# Patient Record
Sex: FEMALE | URBAN - METROPOLITAN AREA
[De-identification: names, ages, dates, MRNs, and addresses within clinical notes are randomized per-mention and may not be internally consistent; named-entity substitution may affect disease eponyms.]

---

## 2022-09-20 ENCOUNTER — APPOINTMENT (RX ONLY)
Dept: URBAN - METROPOLITAN AREA CLINIC 15 | Facility: CLINIC | Age: 32
Setting detail: DERMATOLOGY
End: 2022-09-20

## 2022-09-20 VITALS — WEIGHT: 101 LBS | HEIGHT: 62 IN

## 2022-09-20 DIAGNOSIS — L259 CONTACT DERMATITIS AND OTHER ECZEMA, UNSPECIFIED CAUSE: ICD-10-CM

## 2022-09-20 PROBLEM — L23.3 ALLERGIC CONTACT DERMATITIS DUE TO DRUGS IN CONTACT WITH SKIN: Status: ACTIVE | Noted: 2022-09-20

## 2022-09-20 PROCEDURE — ? PRESCRIPTION MEDICATION MANAGEMENT

## 2022-09-20 PROCEDURE — ? COUNSELING

## 2022-09-20 PROCEDURE — 99204 OFFICE O/P NEW MOD 45 MIN: CPT

## 2022-09-20 PROCEDURE — ? PRESCRIPTION

## 2022-09-20 RX ORDER — MUPIROCIN 20 MG/G
1 OINTMENT TOPICAL BID
Qty: 22 | Refills: 0 | Status: ERX | COMMUNITY
Start: 2022-09-20

## 2022-09-20 RX ORDER — BETAMETHASONE DIPROPIONATE 0.5 MG/G
1 OINTMENT TOPICAL BID
Qty: 45 | Refills: 0 | Status: ERX

## 2022-09-20 RX ADMIN — MUPIROCIN 1: 20 OINTMENT TOPICAL at 00:00

## 2022-09-20 ASSESSMENT — LOCATION ZONE DERM: LOCATION ZONE: LEG

## 2022-09-20 ASSESSMENT — LOCATION SIMPLE DESCRIPTION DERM
LOCATION SIMPLE: LEFT ANKLE
LOCATION SIMPLE: RIGHT ANKLE

## 2022-09-20 ASSESSMENT — LOCATION DETAILED DESCRIPTION DERM
LOCATION DETAILED: RIGHT ANTERIOR MEDIAL MALLEOLUS
LOCATION DETAILED: LEFT ANKLE

## 2022-09-20 NOTE — PROCEDURE: PRESCRIPTION MEDICATION MANAGEMENT
Initiate Treatment: W[place]o Everts area with a gentle cleanser daily and pat dry. Recommend Cetaphil cleanser \\n-Apply betamethasone diproprionate 0.05% to affected area daily for 2 weeks. \\n-Apply mupriocin 2% to affected area daily for 2 weeks. \\n\\nPM\\n-Wash area with a gentle cleanser daily and pat dry. Recommend Cetaphil cleanser \\n-Apply betamethasone diproprionate 0.05% to affected area daily for 2 weeks. \\n-Apply mupriocin 2% to affected area daily for 2 weeks. \\n\\n-Avoid adhesive tape or bandages.
Plan: .\\n\\nFollow-up in 2 weeks to evaluate lesion, if no improvement is seen a biopsy is recommended.  Patient was quoted $222 for shave biopsy
Detail Level: Zone
Render In Strict Bullet Format?: No

## 2022-09-20 NOTE — HPI: SKIN LESION
What Type Of Note Output Would You Prefer (Optional)?: Bullet Format
How Severe Is Your Skin Lesion?: moderate
Has Your Skin Lesion Been Treated?: not been treated
Is This A New Presentation, Or A Follow-Up?: Skin Lesion
Additional History: Patient states she got a mosquito bite on August 13, 2022 and the day after a blister formed on top of the bite. She states she has not traveled outside of the country. She states last week she went to the urgent care and was prescribed betamethasone dipropronate ointment but the lesions have not improved. Patient states she would like to know if itâs better to leave the lesion covered or uncovered. She would also like to know if the lesion should be untouched or removed.